# Patient Record
Sex: FEMALE | Race: OTHER
[De-identification: names, ages, dates, MRNs, and addresses within clinical notes are randomized per-mention and may not be internally consistent; named-entity substitution may affect disease eponyms.]

---

## 2022-07-31 ENCOUNTER — HOSPITAL ENCOUNTER (EMERGENCY)
Dept: HOSPITAL 54 - ER | Age: 27
Discharge: HOME | End: 2022-07-31
Payer: COMMERCIAL

## 2022-07-31 VITALS — DIASTOLIC BLOOD PRESSURE: 71 MMHG | SYSTOLIC BLOOD PRESSURE: 129 MMHG

## 2022-07-31 VITALS — HEIGHT: 64 IN | BODY MASS INDEX: 22.2 KG/M2 | WEIGHT: 130 LBS
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
## 2022-07-31 DIAGNOSIS — F41.9: ICD-10-CM

## 2022-07-31 DIAGNOSIS — Z79.899: ICD-10-CM

## 2022-07-31 DIAGNOSIS — F10.239: ICD-10-CM

## 2022-07-31 DIAGNOSIS — R56.9: Primary | ICD-10-CM

## 2022-07-31 LAB
ALBUMIN SERPL BCP-MCNC: 3.4 G/DL (ref 3.4–5)
ALP SERPL-CCNC: 52 U/L (ref 46–116)
ALT SERPL W P-5'-P-CCNC: 44 U/L (ref 12–78)
AST SERPL W P-5'-P-CCNC: 57 U/L (ref 15–37)
BASOPHILS # BLD AUTO: 0 K/UL (ref 0–0.2)
BASOPHILS NFR BLD AUTO: 0.9 % (ref 0–2)
BILIRUB DIRECT SERPL-MCNC: 0.4 MG/DL (ref 0–0.2)
BILIRUB SERPL-MCNC: 0.8 MG/DL (ref 0.2–1)
BUN SERPL-MCNC: 6 MG/DL (ref 7–18)
CALCIUM SERPL-MCNC: 9.3 MG/DL (ref 8.5–10.1)
CHLORIDE SERPL-SCNC: 98 MMOL/L (ref 98–107)
CO2 SERPL-SCNC: 20 MMOL/L (ref 21–32)
CREAT SERPL-MCNC: 1.1 MG/DL (ref 0.6–1.3)
EOSINOPHIL NFR BLD AUTO: 2 % (ref 0–6)
EOSINOPHIL NFR BLD MANUAL: 1 % (ref 0–4)
ETHANOL SERPL-MCNC: < 3 MG/DL (ref 0–0)
GLUCOSE SERPL-MCNC: 171 MG/DL (ref 74–106)
HCT VFR BLD AUTO: 41 % (ref 33–45)
HGB BLD-MCNC: 14.1 G/DL (ref 11.5–14.8)
LYMPHOCYTES NFR BLD AUTO: 1.2 K/UL (ref 0.8–4.8)
LYMPHOCYTES NFR BLD AUTO: 29.5 % (ref 20–44)
LYMPHOCYTES NFR BLD MANUAL: 25 % (ref 16–48)
MCHC RBC AUTO-ENTMCNC: 35 G/DL (ref 31–36)
MCV RBC AUTO: 101 FL (ref 82–100)
MONOCYTES NFR BLD AUTO: 0.4 K/UL (ref 0.1–1.3)
MONOCYTES NFR BLD AUTO: 9.6 % (ref 2–12)
MONOCYTES NFR BLD MANUAL: 9 % (ref 0–11)
NEUTROPHILS # BLD AUTO: 2.3 K/UL (ref 1.8–8.9)
NEUTROPHILS NFR BLD AUTO: 58 % (ref 43–81)
NEUTS BAND NFR BLD MANUAL: 2 % (ref 0–5)
NEUTS SEG NFR BLD MANUAL: 55 % (ref 42–76)
PLATELET # BLD AUTO: 75 K/UL (ref 150–450)
POTASSIUM SERPL-SCNC: 3.4 MMOL/L (ref 3.5–5.1)
PROT SERPL-MCNC: 8 G/DL (ref 6.4–8.2)
RBC # BLD AUTO: 4.02 MIL/UL (ref 4–5.2)
SODIUM SERPL-SCNC: 133 MMOL/L (ref 136–145)
VARIANT LYMPHS NFR BLD MANUAL: 8 % (ref 0–0)
WBC NRBC COR # BLD AUTO: 4 K/UL (ref 4.3–11)

## 2022-07-31 PROCEDURE — 84703 CHORIONIC GONADOTROPIN ASSAY: CPT

## 2022-07-31 PROCEDURE — 82962 GLUCOSE BLOOD TEST: CPT

## 2022-07-31 PROCEDURE — 71045 X-RAY EXAM CHEST 1 VIEW: CPT

## 2022-07-31 PROCEDURE — 96376 TX/PRO/DX INJ SAME DRUG ADON: CPT

## 2022-07-31 PROCEDURE — 85007 BL SMEAR W/DIFF WBC COUNT: CPT

## 2022-07-31 PROCEDURE — 36415 COLL VENOUS BLD VENIPUNCTURE: CPT

## 2022-07-31 PROCEDURE — 85025 COMPLETE CBC W/AUTO DIFF WBC: CPT

## 2022-07-31 PROCEDURE — 93005 ELECTROCARDIOGRAM TRACING: CPT

## 2022-07-31 PROCEDURE — 99285 EMERGENCY DEPT VISIT HI MDM: CPT

## 2022-07-31 PROCEDURE — 96361 HYDRATE IV INFUSION ADD-ON: CPT

## 2022-07-31 PROCEDURE — G0480 DRUG TEST DEF 1-7 CLASSES: HCPCS

## 2022-07-31 PROCEDURE — 80320 DRUG SCREEN QUANTALCOHOLS: CPT

## 2022-07-31 PROCEDURE — 80307 DRUG TEST PRSMV CHEM ANLYZR: CPT

## 2022-07-31 PROCEDURE — 70450 CT HEAD/BRAIN W/O DYE: CPT

## 2022-07-31 PROCEDURE — 85730 THROMBOPLASTIN TIME PARTIAL: CPT

## 2022-07-31 PROCEDURE — 80048 BASIC METABOLIC PNL TOTAL CA: CPT

## 2022-07-31 PROCEDURE — 96374 THER/PROPH/DIAG INJ IV PUSH: CPT

## 2022-07-31 PROCEDURE — 80076 HEPATIC FUNCTION PANEL: CPT

## 2022-07-31 NOTE — NUR
-------------------------------------------------------------------------------

           *** Note undone in [facility] - 07/31/22 at 2024 by ANNABELLA ***           

-------------------------------------------------------------------------------

PATIENT TAKEN TO CT VIA [facility]

## 2022-07-31 NOTE — NUR
[id number]. ALTERED, CONFUSED REPORTED SHE STIFFENED UP FOR 20 SEC. PT REPORTS 
HAVING STOPPED DRINKING X1 WEEK AGO AFTER DRINKING DAILY. PT CURRENTLY AWAKE 
AND ALERT BREATHING EVEN AND UNLABORED,PLACED ON MONITOR ANDPULSE OX AND TACHY 
IN 150S.

## 2022-08-05 ENCOUNTER — HOSPITAL ENCOUNTER (EMERGENCY)
Dept: HOSPITAL 54 - ER | Age: 27
Discharge: HOME | End: 2022-08-05
Payer: COMMERCIAL

## 2022-08-05 VITALS — BODY MASS INDEX: 23.05 KG/M2 | HEIGHT: 64 IN | WEIGHT: 135 LBS

## 2022-08-05 VITALS — SYSTOLIC BLOOD PRESSURE: 121 MMHG | DIASTOLIC BLOOD PRESSURE: 72 MMHG

## 2022-08-05 DIAGNOSIS — F41.9: ICD-10-CM
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
## 2022-08-05 DIAGNOSIS — Z76.0: ICD-10-CM

## 2022-08-05 DIAGNOSIS — F10.239: Primary | ICD-10-CM

## 2022-08-05 DIAGNOSIS — Z79.899: ICD-10-CM

## 2022-08-05 DIAGNOSIS — R00.0: ICD-10-CM

## 2022-08-05 NOTE — NUR
[facility] THE PATIENT IS ALERT AND ORIENTED 
X4. IN ROOM AIR AND DENIES SOB. RESPIRATION REGULAR AND UNLABORED. DENIES 
SI/HI. DENIES HAVING VISUAL/AUDITORY HALLUCINATIONS. WILL CONTINUE TO MONITOR 
THE PATIENT.